# Patient Record
Sex: MALE | Race: WHITE | NOT HISPANIC OR LATINO | Employment: FULL TIME | URBAN - METROPOLITAN AREA
[De-identification: names, ages, dates, MRNs, and addresses within clinical notes are randomized per-mention and may not be internally consistent; named-entity substitution may affect disease eponyms.]

---

## 2022-08-31 ENCOUNTER — OFFICE VISIT (OUTPATIENT)
Dept: FAMILY MEDICINE | Facility: CLINIC | Age: 42
End: 2022-08-31
Payer: COMMERCIAL

## 2022-08-31 VITALS
OXYGEN SATURATION: 96 % | HEART RATE: 96 BPM | WEIGHT: 265 LBS | SYSTOLIC BLOOD PRESSURE: 132 MMHG | TEMPERATURE: 98.6 F | HEIGHT: 74 IN | DIASTOLIC BLOOD PRESSURE: 88 MMHG | RESPIRATION RATE: 18 BRPM | BODY MASS INDEX: 34.01 KG/M2

## 2022-08-31 DIAGNOSIS — Z83.2 FAMILY HISTORY OF FACTOR V LEIDEN MUTATION: ICD-10-CM

## 2022-08-31 DIAGNOSIS — L72.3 SEBACEOUS CYST: ICD-10-CM

## 2022-08-31 DIAGNOSIS — L91.8 SKIN TAG: ICD-10-CM

## 2022-08-31 DIAGNOSIS — Z12.5 SCREENING FOR PROSTATE CANCER: ICD-10-CM

## 2022-08-31 DIAGNOSIS — Z12.11 SCREEN FOR COLON CANCER: ICD-10-CM

## 2022-08-31 DIAGNOSIS — Z23 NEED FOR TDAP VACCINATION: ICD-10-CM

## 2022-08-31 DIAGNOSIS — Z00.00 ROUTINE GENERAL MEDICAL EXAMINATION AT A HEALTH CARE FACILITY: Primary | ICD-10-CM

## 2022-08-31 DIAGNOSIS — Z13.6 CARDIOVASCULAR SCREENING; LDL GOAL LESS THAN 160: ICD-10-CM

## 2022-08-31 DIAGNOSIS — G89.29 CHRONIC LEFT SHOULDER PAIN: ICD-10-CM

## 2022-08-31 DIAGNOSIS — M25.512 CHRONIC LEFT SHOULDER PAIN: ICD-10-CM

## 2022-08-31 DIAGNOSIS — Z51.81 MEDICATION MONITORING ENCOUNTER: ICD-10-CM

## 2022-08-31 LAB
ALBUMIN UR-MCNC: NEGATIVE MG/DL
APPEARANCE UR: CLEAR
BILIRUB UR QL STRIP: NEGATIVE
COLOR UR AUTO: YELLOW
ERYTHROCYTE [DISTWIDTH] IN BLOOD BY AUTOMATED COUNT: 11.9 % (ref 10–15)
FACTOR V INTERPRETATION: NORMAL
GLUCOSE UR STRIP-MCNC: NEGATIVE MG/DL
HCT VFR BLD AUTO: 45.1 % (ref 40–53)
HGB BLD-MCNC: 16.5 G/DL (ref 13.3–17.7)
HGB UR QL STRIP: NEGATIVE
KETONES UR STRIP-MCNC: NEGATIVE MG/DL
LAB DIRECTOR COMMENTS: NORMAL
LAB DIRECTOR DISCLAIMER: NORMAL
LAB DIRECTOR INTERPRETATION: NORMAL
LAB DIRECTOR METHODOLOGY: NORMAL
LAB DIRECTOR RESULTS: NORMAL
LEUKOCYTE ESTERASE UR QL STRIP: NEGATIVE
MCH RBC QN AUTO: 31 PG (ref 26.5–33)
MCHC RBC AUTO-ENTMCNC: 36.6 G/DL (ref 31.5–36.5)
MCV RBC AUTO: 85 FL (ref 78–100)
NITRATE UR QL: NEGATIVE
PH UR STRIP: 5.5 [PH] (ref 5–7)
PLATELET # BLD AUTO: 291 10E3/UL (ref 150–450)
RBC # BLD AUTO: 5.33 10E6/UL (ref 4.4–5.9)
SP GR UR STRIP: 1.02 (ref 1–1.03)
SPECIMEN DESCRIPTION: NORMAL
UROBILINOGEN UR STRIP-ACNC: 0.2 E.U./DL
WBC # BLD AUTO: 5.3 10E3/UL (ref 4–11)

## 2022-08-31 PROCEDURE — 84443 ASSAY THYROID STIM HORMONE: CPT | Performed by: FAMILY MEDICINE

## 2022-08-31 PROCEDURE — 81241 F5 GENE: CPT | Performed by: FAMILY MEDICINE

## 2022-08-31 PROCEDURE — 82043 UR ALBUMIN QUANTITATIVE: CPT | Performed by: FAMILY MEDICINE

## 2022-08-31 PROCEDURE — 82550 ASSAY OF CK (CPK): CPT | Performed by: FAMILY MEDICINE

## 2022-08-31 PROCEDURE — 36415 COLL VENOUS BLD VENIPUNCTURE: CPT | Performed by: FAMILY MEDICINE

## 2022-08-31 PROCEDURE — 80061 LIPID PANEL: CPT | Performed by: FAMILY MEDICINE

## 2022-08-31 PROCEDURE — 90471 IMMUNIZATION ADMIN: CPT | Performed by: FAMILY MEDICINE

## 2022-08-31 PROCEDURE — G0452 MOLECULAR PATHOLOGY INTERPR: HCPCS | Mod: 59 | Performed by: PATHOLOGY

## 2022-08-31 PROCEDURE — 81003 URINALYSIS AUTO W/O SCOPE: CPT | Performed by: FAMILY MEDICINE

## 2022-08-31 PROCEDURE — 99386 PREV VISIT NEW AGE 40-64: CPT | Mod: 25 | Performed by: FAMILY MEDICINE

## 2022-08-31 PROCEDURE — 80053 COMPREHEN METABOLIC PANEL: CPT | Performed by: FAMILY MEDICINE

## 2022-08-31 PROCEDURE — 85027 COMPLETE CBC AUTOMATED: CPT | Performed by: FAMILY MEDICINE

## 2022-08-31 PROCEDURE — G0103 PSA SCREENING: HCPCS | Performed by: FAMILY MEDICINE

## 2022-08-31 PROCEDURE — 90715 TDAP VACCINE 7 YRS/> IM: CPT | Performed by: FAMILY MEDICINE

## 2022-08-31 ASSESSMENT — ENCOUNTER SYMPTOMS
NERVOUS/ANXIOUS: 0
ARTHRALGIAS: 0
DIZZINESS: 0
JOINT SWELLING: 0
HEMATOCHEZIA: 0
CHILLS: 0
DYSURIA: 0
HEARTBURN: 0
SORE THROAT: 0
WEAKNESS: 0
ABDOMINAL PAIN: 0
CONSTIPATION: 0
EYE PAIN: 0
DIARRHEA: 0
COUGH: 0
HEADACHES: 0
PARESTHESIAS: 0
MYALGIAS: 0
FEVER: 0
NAUSEA: 0
FREQUENCY: 0
HEMATURIA: 0
PALPITATIONS: 0
SHORTNESS OF BREATH: 0

## 2022-08-31 NOTE — PROGRESS NOTES
[facility]  [place]    SUBJECTIVE    CC: Rome Cabezas is an 41 year old male who presents for preventative health visit.     Patient has been advised of split billing requirements and indicates understanding: Yes     Healthy Habits:     Getting at least 3 servings of Calcium per day:  Yes    Bi-annual eye exam:  NO    Dental care twice a year:  NO    Sleep apnea or symptoms of sleep apnea:  None    Diet:  Regular (no restrictions)    Frequency of exercise:  2-3 days/week    Duration of exercise:  15-30 minutes    Taking medications regularly:  Yes    Medication side effects:  Not applicable    PHQ-2 Total Score: 0    Additional concerns today:  No     Today's PHQ-2 Score:   PHQ-2 (  [employer]) 2022   Q1: Little interest or pleasure in doing things 0   Q2: Feeling down, depressed or hopeless 0   PHQ-2 Score 0   Q1: Little interest or pleasure in doing things Not at all   Q2: Feeling down, depressed or hopeless Not at all   PHQ-2 Score 0       Abuse: Current or Past(Physical, Sexual or Emotional)- No  Do you feel safe in your environment? Yes    Social History     Tobacco Use     Smoking status: Former Smoker     Packs/day: 0.50     Years: 10.00     Pack years: 5.00     Quit date: 2016     Years since quittin.6     Smokeless tobacco: Current User     Tobacco comment: chew - 1-2 tins per week   Substance Use Topics     Alcohol use: Yes     Alcohol/week: 3.0 - 7.0 standard drinks     Types: 3 - 7 Standard drinks or equivalent per week     Comment: 3 - 7 drinks per week     If you drink alcohol do you typically have >3 drinks per day or >7 drinks per week? No    Alcohol Use 2022   Prescreen: >3 drinks/day or >7 drinks/week? No   Prescreen: >3 drinks/day or >7 drinks/week? -     Reviewed orders with patient. Reviewed health maintenance and updated orders accordingly - Yes    Reviewed and updated as needed this visit by clinical staff   Tobacco  Allergies  Meds   Med Hx  Surg Hx  Fam Hx   Soc Hx        Reviewed and updated as needed this visit by Provider                   Review of Systems   Constitutional: Negative for chills and fever.   HENT: Negative for congestion, ear pain, hearing loss and sore throat.    Eyes: Negative for pain and visual disturbance.   Respiratory: Negative for cough and shortness of breath.    Cardiovascular: Negative for chest pain, palpitations and peripheral edema.   Gastrointestinal: Negative for abdominal pain, constipation, diarrhea, heartburn, hematochezia and nausea.   Genitourinary: Negative for dysuria, frequency, genital sores, hematuria, impotence, penile discharge and urgency.   Musculoskeletal: Negative for arthralgias, joint swelling and myalgias.   Skin: Negative for rash.   Neurological: Negative for dizziness, weakness, headaches and paresthesias.   Psychiatric/Behavioral: Negative for mood changes. The patient is not nervous/anxious.      Has a bump on head- some drainage.- sebaceous cyst x 3 - 4 to 15 mm - 2 on left upper lateral, 1 on rt upper lateral    Skin tag on right side x 2 - 3 mm    Left shoulder tightness - 2014    Toe on left foot- fungus - left 4th    Lipids    No results for input(s): CHOL, HDL, LDL, TRIG, CHOLHDLRATIO in the last 30179 hours.    Health Maintenance     Colonoscopy:  Due at age 46   FIT:  given              PSA:  pending   DEXA:  NA    Health Maintenance Due   Topic Date Due     ANNUAL REVIEW OF HM ORDERS  Never done     ADVANCE CARE PLANNING  Never done     COVID-19 Vaccine (1) Never done     HIV SCREENING  Never done     HEPATITIS C SCREENING  Never done     INFLUENZA VACCINE (1) Never done       Current Problem List    Patient Active Problem List   Diagnosis     CARDIOVASCULAR SCREENING; LDL GOAL LESS THAN 160       Past Medical History    Past Medical History:   Diagnosis Date     CARDIOVASCULAR SCREENING; LDL GOAL LESS THAN 160        Past Surgical History    Past Surgical History:   Procedure Laterality Date     NO  HISTORY OF SURGERY         Current Medications    No current outpatient medications on file.     Allergies    No Known Allergies    Immunizations    Immunization History   Administered Date(s) Administered     Tdap (Adacel,Boostrix) 2012, 2012, 2022       Family History    Family History   Problem Relation Age of Onset     Hypertension Father      Factor V Leiden deficiency Father      Cerebrovascular Disease Maternal Grandmother      Diabetes Paternal Grandmother      Pacemaker Paternal Grandfather      Coronary Artery Disease Paternal Grandfather        Social History    Social History     Socioeconomic History     Marital status:      Spouse name: Idalia     Number of children: 2     Years of education: 16     Highest education level: Not on file   Occupational History     Not on file   Tobacco Use     Smoking status: Former Smoker     Packs/day: 0.50     Years: 10.00     Pack years: 5.00     Quit date: 2016     Years since quittin.6     Smokeless tobacco: Current User     Tobacco comment: chew - 1-2 tins per week   Vaping Use     Vaping Use: Never used   Substance and Sexual Activity     Alcohol use: Yes     Alcohol/week: 3.0 - 7.0 standard drinks     Types: 3 - 7 Standard drinks or equivalent per week     Comment: 3 - 7 drinks per week     Drug use: No     Sexual activity: Yes     Partners: Female   Other Topics Concern     Parent/sibling w/ CABG, MI or angioplasty before 65F 55M? Not Asked      Service Not Asked     Blood Transfusions Not Asked     Caffeine Concern Yes     Comment: 2-3 cups daily     Occupational Exposure Not Asked     Hobby Hazards Not Asked     Sleep Concern Not Asked     Stress Concern Not Asked     Weight Concern Not Asked     Special Diet Not Asked     Back Care Not Asked     Exercise Yes     Comment: occas     Bike Helmet Not Asked     Seat Belt Yes     Self-Exams Not Asked   Social History Narrative     Not on file     Social Determinants of  "Health     Financial Resource Strain: Not on file   Food Insecurity: Not on file   Transportation Needs: Not on file   Physical Activity: Not on file   Stress: Not on file   Social Connections: Not on file   Intimate Partner Violence: Not on file   Housing Stability: Not on file       ROS    CONSTITUTIONAL: NEGATIVE for fever, chills, change in weight  INTEGUMENTARY/SKIN: NEGATIVE for worrisome rashes, moles or lesions  EYES: NEGATIVE for vision changes or irritation  ENT/MOUTH: NEGATIVE for ear, mouth and throat problems  RESP: NEGATIVE for significant cough or SOB  BREAST: NEGATIVE for masses, tenderness or discharge  CV: NEGATIVE for chest pain, palpitations or peripheral edema  GI: NEGATIVE for nausea, abdominal pain, heartburn, or change in bowel habits  : NEGATIVE for frequency, dysuria, or hematuria  MUSCULOSKELETAL: NEGATIVE for significant arthralgias or myalgia  NEURO: NEGATIVE for weakness, dizziness or paresthesias  ENDOCRINE: NEGATIVE for temperature intolerance, skin/hair changes  HEME: NEGATIVE for bleeding problems  PSYCHIATRIC: NEGATIVE for changes in mood or affect    OBJECTIVE    /88   Pulse 96   Temp 98.6  F (37  C)   Resp 18   Ht 1.88 m (6' 2\")   Wt 120.2 kg (265 lb)   SpO2 96%   BMI 34.02 kg/m      EXAM:    GENERAL: healthy, alert and no distress  EYES: Eyes grossly normal to inspection, PERRL and conjunctivae and sclerae normal  HENT: ear canals and TM's normal, nose and mouth without ulcers or lesions  NECK: no adenopathy, no asymmetry, masses, or scars and thyroid normal to palpation  RESP: lungs clear to auscultation - no rales, rhonchi or wheezes  CV: regular rate and rhythm, normal S1 S2, no S3 or S4, no murmur, click or rub, no peripheral edema and peripheral pulses strong  ABDOMEN: soft, nontender, no hepatosplenomegaly, no masses and bowel sounds normal   (male): testicles normal without atrophy or masses, no hernias and penis normal without urethral discharge  RECTAL: " normal sphincter tone, no rectal masses and prostate of normal size for age, smooth, nontender without masses/nodules  MS: no gross musculoskeletal defects noted, no edema  SKIN: no suspicious lesions or rashes  NEURO: Normal strength and tone, mentation intact and speech normal  PSYCH: mentation appears normal, affect normal/bright  LYMPH: no cervical, supraclavicular, axillary, or inguinal adenopathy    DIAGNOSTICS/PROCEDURES    Pending    ASSESSMENT      ICD-10-CM    1. Routine general medical examination at a health care facility  Z00.00 Comprehensive metabolic panel     Lipid panel reflex to direct LDL Fasting     CBC with platelets     CK total     UA reflex to Microscopic and Culture     Albumin Random Urine Quantitative with Creat Ratio     TSH with free T4 reflex     Prostate Specific Antigen Screen     Fecal colorectal cancer screen FIT     Factor 5 leiden mutation analysis     Comprehensive metabolic panel     Lipid panel reflex to direct LDL Fasting     CBC with platelets     CK total     TSH with free T4 reflex     Prostate Specific Antigen Screen     Factor 5 leiden mutation analysis     Fecal colorectal cancer screen FIT     UA reflex to Microscopic and Culture     Albumin Random Urine Quantitative with Creat Ratio   2. CARDIOVASCULAR SCREENING; LDL GOAL LESS THAN 160  Z13.6 Lipid panel reflex to direct LDL Fasting     Lipid panel reflex to direct LDL Fasting     OFFICE/OUTPT VISIT,EST,LEVL IV   3. Family history of factor V Leiden mutation  Z83.2 Factor 5 leiden mutation analysis     Factor 5 leiden mutation analysis     OFFICE/OUTPT VISIT,EST,LEVL IV   4. Skin tag  L91.8 OFFICE/OUTPT VISIT,EST,LEVL IV   5. Sebaceous cyst  L72.3 OFFICE/OUTPT VISIT,EST,LEVL IV   6. Chronic left shoulder pain  M25.512 OFFICE/OUTPT VISIT,EST,LEVL IV    G89.29    7. Screen for colon cancer  Z12.11    8. Screening for prostate cancer  Z12.5    9. Need for Tdap vaccination  Z23 TDAP VACCINE (Adacel, Boostrix)  [[id number]]      OFFICE/OUTPT VISIT,EST,LEVL IV   10. Medication monitoring encounter  Z51.81        PLAN    Discussed treatment/modality options, including risk and benefits, he desires:    advised alcohol consumption 1oz per day or less, advised 1 multivitamin per day, advised calcium 7599-7942 mg/d and Vitamin D 800-1200 IU/d, advised dentist every 6 months, advised diet, exercise, and weight loss, advised opthalmologist every 1-2 years, advised self testicular exam q month, further health care maintenance, further lab(s), immunization(s) and observation    Discussed controversies surrounding PSA. Specifically reviewed 2017 USPSTF findings recommending discussion of PSA testing for men ages 55-69.  Reviewed findings of the  Randomized Study of Screening for Prostate Cancer which showed a 30% reduction in advanced stage prostate cancer and a 20% reduction in death rate from prostate cancer in this age group. PSA-based screening may prevent up to 2 deaths and up to 3 cases of metastatic disease per 1,000 men screened over 13 years.    We've elected to do PSA this year after discussing these controversies.    All diagnosis above reviewed and noted above, otherwise stable.      See [facility] orders for further details.      1) Labs pending    2) TdaP given    3) Consider COVID / Flu    4) sebaceous cyst I&D x 3 with skin tag x 2 removal soon    5) FH factor V - await labs, consent signed    Return in about 2 weeks (around 9/14/2022).    Health Maintenance Due   Topic Date Due     ANNUAL REVIEW OF  ORDERS  Never done     ADVANCE CARE PLANNING  Never done     COVID-19 Vaccine (1) Never done     HIV SCREENING  Never done     HEPATITIS C SCREENING  Never done     INFLUENZA VACCINE (1) Never done       COUNSELING    Reviewed preventive health counseling, as reflected in patient instructions    BP Readings from Last 1 Encounters:   08/31/22 132/88     Estimated body mass index is 34.02 kg/m  as calculated from the following:     "Height as of this encounter: 1.88 m (6' 2\").    Weight as of this encounter: 120.2 kg (265 lb).      Weight management plan: diet and exercise     reports that he quit smoking about 6 years ago. He has a 5.00 pack-year smoking history. He uses smokeless tobacco.  Tobacco Cessation Action Plan: Information offered: Patient not interested at this time    Counseling Resources:    ATP IV Guidelines  Pooled Cohorts Equation Calculator  FRAX Risk Assessment  ICSI Preventive Guidelines  Dietary Guidelines for Americans, 2010  USDA's MyPlate  ASA Prophylaxis  Lung CA Screening           Horace Zuniga MD, FAAFP     [facility]  [address] [place] [id number]  [email or web address]   Office: [phone]  Fax: [phone]  Pager: [phone]     "

## 2022-09-01 LAB
ALBUMIN SERPL-MCNC: 4.3 G/DL (ref 3.4–5)
ALP SERPL-CCNC: 68 U/L (ref 40–150)
ALT SERPL W P-5'-P-CCNC: 65 U/L (ref 0–70)
ANION GAP SERPL CALCULATED.3IONS-SCNC: 10 MMOL/L (ref 3–14)
AST SERPL W P-5'-P-CCNC: 30 U/L (ref 0–45)
BILIRUB SERPL-MCNC: 1.3 MG/DL (ref 0.2–1.3)
BUN SERPL-MCNC: 15 MG/DL (ref 7–30)
CALCIUM SERPL-MCNC: 9.4 MG/DL (ref 8.5–10.1)
CHLORIDE BLD-SCNC: 101 MMOL/L (ref 94–109)
CHOLEST SERPL-MCNC: 193 MG/DL
CK SERPL-CCNC: 46 U/L (ref 30–300)
CO2 SERPL-SCNC: 24 MMOL/L (ref 20–32)
CREAT SERPL-MCNC: 0.97 MG/DL (ref 0.66–1.25)
CREAT UR-MCNC: 70 MG/DL
FASTING STATUS PATIENT QL REPORTED: YES
GFR SERPL CREATININE-BSD FRML MDRD: >90 ML/MIN/1.73M2
GLUCOSE BLD-MCNC: 88 MG/DL (ref 70–99)
HDLC SERPL-MCNC: 28 MG/DL
LDLC SERPL CALC-MCNC: 90 MG/DL
MICROALBUMIN UR-MCNC: 68 MG/L
MICROALBUMIN/CREAT UR: 97.14 MG/G CR (ref 0–17)
NONHDLC SERPL-MCNC: 165 MG/DL
POTASSIUM BLD-SCNC: 4 MMOL/L (ref 3.4–5.3)
PROT SERPL-MCNC: 8 G/DL (ref 6.8–8.8)
PSA SERPL-MCNC: 0.66 UG/L (ref 0–4)
SODIUM SERPL-SCNC: 135 MMOL/L (ref 133–144)
TRIGL SERPL-MCNC: 375 MG/DL
TSH SERPL DL<=0.005 MIU/L-ACNC: 1.78 MU/L (ref 0.4–4)

## 2022-09-05 PROCEDURE — 82274 ASSAY TEST FOR BLOOD FECAL: CPT | Performed by: FAMILY MEDICINE

## 2022-09-07 LAB — HEMOCCULT STL QL IA: NEGATIVE

## 2022-09-09 DIAGNOSIS — E78.2 MIXED HYPERLIPIDEMIA: Primary | ICD-10-CM

## 2022-09-09 DIAGNOSIS — R80.9 PROTEINURIA: ICD-10-CM

## 2022-09-09 NOTE — PROGRESS NOTES
Orders per result note.    Victoriano FREIRE RN   [facility] [place] [facility] Triage

## 2022-09-11 ENCOUNTER — HEALTH MAINTENANCE LETTER (OUTPATIENT)
Age: 42
End: 2022-09-11

## 2022-11-07 ENCOUNTER — OFFICE VISIT (OUTPATIENT)
Dept: FAMILY MEDICINE | Facility: CLINIC | Age: 42
End: 2022-11-07
Payer: COMMERCIAL

## 2022-11-07 VITALS
WEIGHT: 269.9 LBS | BODY MASS INDEX: 34.64 KG/M2 | RESPIRATION RATE: 16 BRPM | HEIGHT: 74 IN | HEART RATE: 82 BPM | DIASTOLIC BLOOD PRESSURE: 82 MMHG | SYSTOLIC BLOOD PRESSURE: 128 MMHG | OXYGEN SATURATION: 99 % | TEMPERATURE: 98.4 F

## 2022-11-07 DIAGNOSIS — Z12.83 SCREENING FOR SKIN CANCER: ICD-10-CM

## 2022-11-07 DIAGNOSIS — L98.9 SKIN LESION: Primary | ICD-10-CM

## 2022-11-07 DIAGNOSIS — L91.8 SKIN TAG: ICD-10-CM

## 2022-11-07 DIAGNOSIS — L72.3 SEBACEOUS CYST: ICD-10-CM

## 2022-11-07 PROCEDURE — 11300 SHAVE SKIN LESION 0.5 CM/<: CPT | Performed by: FAMILY MEDICINE

## 2022-11-07 PROCEDURE — 88305 TISSUE EXAM BY PATHOLOGIST: CPT | Mod: 59 | Performed by: PATHOLOGY

## 2022-11-07 PROCEDURE — 11306 SHAVE SKIN LESION 0.6-1.0 CM: CPT | Performed by: FAMILY MEDICINE

## 2022-11-07 PROCEDURE — 10060 I&D ABSCESS SIMPLE/SINGLE: CPT | Mod: 59 | Performed by: FAMILY MEDICINE

## 2022-11-07 PROCEDURE — 11200 RMVL SKIN TAGS UP TO&INC 15: CPT | Mod: 59 | Performed by: FAMILY MEDICINE

## 2022-11-07 NOTE — PROGRESS NOTES
Assessment & Plan       ICD-10-CM    1. Skin lesion  L98.9 SHAV SKIN LESION TRUNK/ARM/LEG <=0.5 CM     SHAV SKIN LESION TRUNK/ARM/LEG <=0.5 CM     SHAV SKIN LESION SCLP/NCK/HNDS/FEET/GEN 0.6-1.0 CM     SHAV SKIN LESION SCLP/NCK/HNDS/FEET/GEN 0.6-1.0 CM      2. Sebaceous cyst  L72.3 EXCISION TUMOR FACE/SCALP SUBQ, <2CM      3. Skin tag  L91.8 SHAV SKIN LESION TRUNK/ARM/LEG <=0.5 CM     SHAV SKIN LESION TRUNK/ARM/LEG <=0.5 CM      4. Screening for skin cancer  Z12.83 Dermatological Path Order and Indications     Dermatological Path Order and Indications     SHAV SKIN LESION TRUNK/ARM/LEG <=0.5 CM     SHAV SKIN LESION TRUNK/ARM/LEG <=0.5 CM     SHAV SKIN LESION TRUNK/ARM/LEG <=0.5 CM     SHAV SKIN LESION TRUNK/ARM/LEG <=0.5 CM     SHAV SKIN LESION SCLP/NCK/HNDS/FEET/GEN 0.6-1.0 CM     SHAV SKIN LESION SCLP/NCK/HNDS/FEET/GEN 0.6-1.0 CM          Discussed treatment/modality options, including risk and benefits, he desires:    1) shave x 6    2) excision x 1 - sebaceous cyst - I&D, irrigation, suture x 2    3) path x 4 (not skin tags, sebaceous cyst)    4) wound cares reviewed    5) sutures out 7-10 days    All diagnosis above reviewed and noted above, otherwise stable.      See [facility] orders for further details.      Return in about 1 week (around 11/14/2022) for Wound check.    No LOS data to display    Doing chart review, history and exam, documentation and further activities as noted.           Horace Zuniga MD, FAAFP     [facility]  [address] [place] [id number]  [email or web address]   Office: [phone]  Fax: [phone]  Pager: [phone]       Kavon Peter is a 42 year old, presenting for the following health issues:    Concern - skin abnormalities    Left proximal lower leg firm raised flesh colored 5 mm lesion    Right lateral proximal forearm 5 mm firm raised flesh colored lesion    Right lower axilla skin  "tags x 2, 3 mm each    Right lateral posterior firm flesh colored raised scalp 6 mm lesion     Left lateral posterior firm mid scalp flesh 6 mm lesion    Left posterior 20 mm cystic lesion - fluctuant - center opening with bloody purulent discharge    Risks and benefits reviewed  Consent signed  Hibiclens  1% lidocaine with epi for local anesthesia    Shave x 6     Excision - sebaceous cyst - no sac noted - purulent discharge noted - irrigated clear with normal saline - sutures x 2 4-0 ethilon for wound approximation    Review of Systems   CONSTITUTIONAL: NEGATIVE for fever, chills, change in weight  INTEGUMENTARY/SKIN: NEGATIVE for worrisome rashes, moles or lesions  EYES: NEGATIVE for vision changes or irritation  ENT/MOUTH: NEGATIVE for ear, mouth and throat problems  RESP: NEGATIVE for significant cough or SOB  CV: NEGATIVE for chest pain, palpitations or peripheral edema  GI: NEGATIVE for nausea, abdominal pain, heartburn, or change in bowel habits  : NEGATIVE for frequency, dysuria, or hematuria  MUSCULOSKELETAL: NEGATIVE for significant arthralgias or myalgia  NEURO: NEGATIVE for weakness, dizziness or paresthesias  ENDOCRINE: NEGATIVE for temperature intolerance, skin/hair changes  HEME: NEGATIVE for bleeding problems  PSYCHIATRIC: NEGATIVE for changes in mood or affect      Objective    /82   Pulse 82   Temp 98.4  F (36.9  C) (Tympanic)   Resp 16   Ht 1.88 m (6' 2\")   Wt 122.4 kg (269 lb 14.4 oz)   SpO2 99%   BMI 34.65 kg/m    Body mass index is 34.65 kg/m .     Physical Exam   GENERAL: healthy, alert and no distress  EYES: Eyes grossly normal to inspection, PERRL and conjunctivae and sclerae normal  NECK: no adenopathy, no asymmetry, masses, or scars and thyroid normal to palpation  RESP: lungs clear to auscultation - no rales, rhonchi or wheezes  CV: regular rate and rhythm, normal S1 S2, no S3 or S4, no murmur, click or rub, no peripheral edema and peripheral pulses strong  ABDOMEN: soft, " nontender, no hepatosplenomegaly, no masses and bowel sounds normal  MS: no gross musculoskeletal defects noted, no edema  SKIN: see above  NEURO: Normal strength and tone, mentation intact and speech normal  PSYCH: mentation appears normal, affect normal/bright    Pathology pending

## 2022-11-09 ENCOUNTER — MYC MEDICAL ADVICE (OUTPATIENT)
Dept: FAMILY MEDICINE | Facility: CLINIC | Age: 42
End: 2022-11-09

## 2022-11-09 LAB
PATH REPORT.COMMENTS IMP SPEC: NORMAL
PATH REPORT.COMMENTS IMP SPEC: NORMAL
PATH REPORT.FINAL DX SPEC: NORMAL
PATH REPORT.GROSS SPEC: NORMAL
PATH REPORT.MICROSCOPIC SPEC OTHER STN: NORMAL
PATH REPORT.RELEVANT HX SPEC: NORMAL

## 2022-11-18 ENCOUNTER — ALLIED HEALTH/NURSE VISIT (OUTPATIENT)
Dept: NURSING | Facility: CLINIC | Age: 42
End: 2022-11-18
Payer: COMMERCIAL

## 2022-11-18 DIAGNOSIS — Z48.02 VISIT FOR SUTURE REMOVAL: Primary | ICD-10-CM

## 2022-11-18 PROCEDURE — 99207 PR NO CHARGE NURSE ONLY: CPT

## 2022-11-18 NOTE — PROGRESS NOTES
Rome Tristancleoorestes presents to the clinic for removal of    Placed 11/7/22       suture and sutures,staples, steri strips. The patient has had sutures and suture in place for 10 days. There has been no patient reported signs or symptoms of infection or drainage. 2  suture and sutures,staples, staple, steri strips are seen and located on the left top of head     . Tetanus status is up to date.     All sutures and sutures,staples, steri strips were easily removed today. Routine wound care discussed by the RN or provider. reviewed signs and symptoms  of cellulitis reviewed triage line/UC in off hours  Site  is WDL     The patient will follow up as needed.    Ninfa TAYLOR RN   [facility] Triage

## 2023-02-10 ENCOUNTER — MYC MEDICAL ADVICE (OUTPATIENT)
Dept: FAMILY MEDICINE | Facility: CLINIC | Age: 43
End: 2023-02-10
Payer: COMMERCIAL

## 2023-04-12 ENCOUNTER — OFFICE VISIT (OUTPATIENT)
Dept: FAMILY MEDICINE | Facility: CLINIC | Age: 43
End: 2023-04-12
Payer: COMMERCIAL

## 2023-04-12 VITALS
HEART RATE: 71 BPM | SYSTOLIC BLOOD PRESSURE: 126 MMHG | TEMPERATURE: 98.6 F | OXYGEN SATURATION: 97 % | RESPIRATION RATE: 12 BRPM | HEIGHT: 74 IN | DIASTOLIC BLOOD PRESSURE: 82 MMHG | WEIGHT: 260 LBS | BODY MASS INDEX: 33.37 KG/M2

## 2023-04-12 DIAGNOSIS — Z30.09 VASECTOMY EVALUATION: Primary | ICD-10-CM

## 2023-04-12 PROCEDURE — 99213 OFFICE O/P EST LOW 20 MIN: CPT | Performed by: FAMILY MEDICINE

## 2023-04-12 ASSESSMENT — PAIN SCALES - GENERAL: PAINLEVEL: NO PAIN (0)

## 2023-04-12 NOTE — PROGRESS NOTES
Indication: Vasectomy Consultation    S:  This patient has presented for discussion of vasectomy.  He and his wife have agreed they are done having children and desire permanent sterilization for him. The procedure was explained in detail using diagrams published by [employer].  The permanency and effectiveness of this operation were explained in detail.  Complications were also explained in detail, including vasectomy failure rate, bleeding, infection, scarring, chronic pain, and epididymitis.      The patient was told to shave his scrotal area the day before the procedure to prep for surgery.  He was also told he'd need to remain inactive for 48-72 hours afterwards, no lifting more than 20 lbs or sexual intercourse for two weeks.  The patient was also told that he should have a post-operative sperm sample checked and should refrain from unprotected sexual intercourse until the sperm sample shows no sperm.      Approximately 20 minutes were used in the discussion of the vasectomy and questions were answered.    O:  On examination, the vas deferens were found bilaterally though this was moderately difficult given thickened scrotal skin    A: Vasectomy consultation    P: Schedule vasectomy.    Arnulfo Sharma MD      Answers for HPI/ROS submitted by the patient on 4/12/2023  What is the reason for your visit today? : Talk to the Dr about a vosectomy  How many servings of fruits and vegetables do you eat daily?: 2-3  On average, how many sweetened beverages do you drink each day (Examples: soda, juice, sweet tea, etc.  Do NOT count diet or artificially sweetened beverages)?: 1  How many minutes a day do you exercise enough to make your heart beat faster?: 20 to 29  How many days a week do you exercise enough to make your heart beat faster?: 4  How many days per week do you miss taking your medication?: 0

## 2023-04-26 ENCOUNTER — OFFICE VISIT (OUTPATIENT)
Dept: FAMILY MEDICINE | Facility: CLINIC | Age: 43
End: 2023-04-26
Attending: FAMILY MEDICINE
Payer: COMMERCIAL

## 2023-04-26 VITALS
BODY MASS INDEX: 33.37 KG/M2 | RESPIRATION RATE: 14 BRPM | TEMPERATURE: 98.4 F | DIASTOLIC BLOOD PRESSURE: 82 MMHG | OXYGEN SATURATION: 98 % | SYSTOLIC BLOOD PRESSURE: 122 MMHG | HEIGHT: 74 IN | WEIGHT: 260 LBS | HEART RATE: 78 BPM

## 2023-04-26 DIAGNOSIS — Z30.2 ENCOUNTER FOR VASECTOMY: ICD-10-CM

## 2023-04-26 PROCEDURE — 55250 REMOVAL OF SPERM DUCT(S): CPT | Performed by: FAMILY MEDICINE

## 2023-04-26 PROCEDURE — 88302 TISSUE EXAM BY PATHOLOGIST: CPT | Mod: 59 | Performed by: PATHOLOGY

## 2023-04-26 ASSESSMENT — PAIN SCALES - GENERAL: PAINLEVEL: NO PAIN (0)

## 2023-04-26 NOTE — PROGRESS NOTES
The patient comes in today for a vasectomy.  The patient and his significant other have previously been in and we discussed the other options for birth control, the risks and benefits of the procedure.  Details of those risks and benefits have been noted in the consent form which has been signed.  This includes the potential for bleeding, potential for infection.      The patient was placed in a supine position on the procedure table.  Scrotal shaving was done by the patient at home.  Sterilely prepped and draped in the usual fashion.  The right vas was first identified and brought up to the midline raphae where a small wheal of Lidocaine without epinephrine was placed in the skin overlying the vas and further anesthesia was injected in to the vas sheath.  The vas was then secured through the scrotal skin with a towel clamp.  A #15 scalpel was then used to incise the skin and then I dissected out the vas free of adventitial tissue.   When a segment of vas was clearly freed up, two titanium surgical clips were placed on the proximal and one on the distal end of a .5-1 cm. segment of the vas.  The vas material between the clips was then cut and removed.  Each ligated end was then cauterized.  When the sterile field was completely dry, the vas was returned to the scrotal sac.      Attention was then turned to the opposite side and proceeded in similar fashion to obtain specimen.  The vas was brought up to the same opening, injected with Lidocaine along the vas sheath and grasped with the clamp, and proceeded in the above fashion.  A 4-0 chromic suture was used for the scrotal skin incision and a single pursestring suture was used to re-approximate the skin edges.    ASSESSMENT:  1)  Male sterilization via vasectomy.     PLAN:  Patient tolerated the procedure quite well.  He will use Tylenol 1000 mg TID with food.  Ice to the scrotum 20 minutes at a time every two to three hours while awake for the next two to three  days.  No heavy lifting or intercourse for 14 days.  The patient will bring in a sample of semen for analysis after 3 months.    Arnulfo Sharma MD

## 2023-04-28 LAB
PATH REPORT.COMMENTS IMP SPEC: NORMAL
PATH REPORT.COMMENTS IMP SPEC: NORMAL
PATH REPORT.FINAL DX SPEC: NORMAL
PATH REPORT.GROSS SPEC: NORMAL
PATH REPORT.MICROSCOPIC SPEC OTHER STN: NORMAL
PATH REPORT.RELEVANT HX SPEC: NORMAL
PHOTO IMAGE: NORMAL

## 2023-08-01 ENCOUNTER — PATIENT OUTREACH (OUTPATIENT)
Dept: CARE COORDINATION | Facility: CLINIC | Age: 43
End: 2023-08-01
Payer: COMMERCIAL

## 2023-08-15 ENCOUNTER — PATIENT OUTREACH (OUTPATIENT)
Dept: CARE COORDINATION | Facility: CLINIC | Age: 43
End: 2023-08-15
Payer: COMMERCIAL

## 2023-10-07 ENCOUNTER — HEALTH MAINTENANCE LETTER (OUTPATIENT)
Age: 43
End: 2023-10-07

## 2024-11-30 ENCOUNTER — HEALTH MAINTENANCE LETTER (OUTPATIENT)
Age: 44
End: 2024-11-30